# Patient Record
Sex: FEMALE | Race: WHITE | NOT HISPANIC OR LATINO | Employment: FULL TIME | URBAN - METROPOLITAN AREA
[De-identification: names, ages, dates, MRNs, and addresses within clinical notes are randomized per-mention and may not be internally consistent; named-entity substitution may affect disease eponyms.]

---

## 2017-03-02 ENCOUNTER — TELEPHONE (OUTPATIENT)
Dept: FAMILY MEDICINE CLINIC | Facility: CLINIC | Age: 37
End: 2017-03-02

## 2017-03-02 ENCOUNTER — OFFICE VISIT (OUTPATIENT)
Dept: FAMILY MEDICINE CLINIC | Facility: CLINIC | Age: 37
End: 2017-03-02

## 2017-03-02 VITALS
WEIGHT: 194 LBS | SYSTOLIC BLOOD PRESSURE: 128 MMHG | HEART RATE: 95 BPM | DIASTOLIC BLOOD PRESSURE: 84 MMHG | HEIGHT: 68 IN | OXYGEN SATURATION: 97 % | BODY MASS INDEX: 29.4 KG/M2 | TEMPERATURE: 99 F

## 2017-03-02 DIAGNOSIS — J06.9 ACUTE URI: ICD-10-CM

## 2017-03-02 DIAGNOSIS — R53.83 OTHER FATIGUE: ICD-10-CM

## 2017-03-02 DIAGNOSIS — F17.200 SMOKING: ICD-10-CM

## 2017-03-02 DIAGNOSIS — R11.0 NAUSEA: ICD-10-CM

## 2017-03-02 DIAGNOSIS — K21.9 GASTROESOPHAGEAL REFLUX DISEASE WITHOUT ESOPHAGITIS: ICD-10-CM

## 2017-03-02 DIAGNOSIS — N95.2 VAGINAL ATROPHY: ICD-10-CM

## 2017-03-02 DIAGNOSIS — Z00.00 HEALTH CARE MAINTENANCE: ICD-10-CM

## 2017-03-02 DIAGNOSIS — R63.1 POLYDIPSIA: ICD-10-CM

## 2017-03-02 DIAGNOSIS — R68.89 FLU-LIKE SYMPTOMS: Primary | ICD-10-CM

## 2017-03-02 PROCEDURE — 99203 OFFICE O/P NEW LOW 30 MIN: CPT | Performed by: FAMILY MEDICINE

## 2017-03-02 PROCEDURE — 87804 INFLUENZA ASSAY W/OPTIC: CPT | Performed by: FAMILY MEDICINE

## 2017-03-02 RX ORDER — ESTRADIOL 0.1 MG/G
2 CREAM VAGINAL DAILY
Qty: 42.5 G | Refills: 5 | Status: SHIPPED | OUTPATIENT
Start: 2017-03-02

## 2017-03-02 RX ORDER — ESTRADIOL 0.1 MG/G
2 CREAM VAGINAL DAILY
Qty: 42.5 G | Refills: 5 | Status: SHIPPED | OUTPATIENT
Start: 2017-03-02 | End: 2017-03-02 | Stop reason: SDUPTHER

## 2017-03-02 RX ORDER — VARENICLINE TARTRATE 1 MG/1
1 TABLET, FILM COATED ORAL 2 TIMES DAILY
Qty: 60 TABLET | Refills: 5 | Status: SHIPPED | OUTPATIENT
Start: 2017-03-02 | End: 2018-08-21

## 2017-03-02 RX ORDER — OMEPRAZOLE 40 MG/1
40 CAPSULE, DELAYED RELEASE ORAL DAILY
Qty: 30 CAPSULE | Refills: 5 | Status: SHIPPED | OUTPATIENT
Start: 2017-03-02 | End: 2017-03-02 | Stop reason: SDUPTHER

## 2017-03-02 RX ORDER — IBUPROFEN 200 MG
200 TABLET ORAL EVERY 6 HOURS
COMMUNITY

## 2017-03-02 RX ORDER — OMEPRAZOLE 40 MG/1
40 CAPSULE, DELAYED RELEASE ORAL DAILY
Qty: 30 CAPSULE | Refills: 5 | Status: SHIPPED | OUTPATIENT
Start: 2017-03-02

## 2017-03-02 RX ORDER — VARENICLINE TARTRATE 1 MG/1
1 TABLET, FILM COATED ORAL 2 TIMES DAILY
Qty: 60 TABLET | Refills: 5 | Status: SHIPPED | OUTPATIENT
Start: 2017-03-02 | End: 2017-03-02 | Stop reason: SDUPTHER

## 2017-03-02 NOTE — PROGRESS NOTES
Subjective   Karlee Martin is a 36 y.o. female. Presents today for   Chief Complaint   Patient presents with   • Establish Care     pt is here to establish care. she had hysterectomy 4 years ago, wants to discuss hormone replacement   • Illness     cough, bodyaches, fever   • Headache     pt has been getting headaches, dizzy, lethargic (has fallen asleep driving), nausea for the last 2 months   • Nicotine Dependence     pt would like rx to quit smoking.       Cough   This is a new problem. Episode onset: 2 days ago. The problem has been unchanged. The problem occurs constantly. The cough is non-productive. Associated symptoms include chills, a fever, headaches, myalgias, nasal congestion, postnasal drip, rhinorrhea and a sore throat. Pertinent negatives include no chest pain, ear congestion, ear pain, hemoptysis, shortness of breath or wheezing. Nothing aggravates the symptoms. She has tried OTC cough suppressant for the symptoms. The treatment provided no relief. There is no history of asthma, COPD or environmental allergies.   Fatigue   This is a chronic problem. The current episode started more than 1 year ago. The problem occurs constantly. The problem has been unchanged. Associated symptoms include chills, congestion, coughing, fatigue, a fever, headaches, myalgias, nausea and a sore throat. Pertinent negatives include no abdominal pain, chest pain, urinary symptoms, visual change or vomiting. Associated symptoms comments: Sleeps a lot and doesn't matter how much sleeps, just very fatigued.  Has been on going longer than current illness.  Denies snoring.  Feels unrested in am, daytime somnolence, fallen asleep while driving.. Nothing aggravates the symptoms. She has tried nothing for the symptoms. The treatment provided no relief.   Nausea   This is a chronic problem. The current episode started more than 1 month ago. The problem occurs intermittently. The problem has been unchanged. Associated symptoms  include chills, congestion, coughing, fatigue, a fever, headaches, myalgias, nausea and a sore throat. Pertinent negatives include no abdominal pain, chest pain, urinary symptoms, visual change or vomiting. Associated symptoms comments: +polydispia, urinary frequency;  Has strong family hx of diabetes.  No polyphagia.  Occly heartburn at night.  NO vomiting.. Nothing aggravates the symptoms. She has tried nothing for the symptoms. The treatment provided no relief.   Nicotine Dependence   Presents for initial visit. Symptoms include fatigue and sore throat. Preferred tobacco types include cigarettes. Preferred cigarette types include filtered. Preferred strength is light. Preferred brands include [employer]. Her urge triggers include company of smokers and driving. She smokes 1 pack of cigarettes per day. Age when first smoked: 17-18 yoa.  quit 20 for 5 years then restarted. Past treatments include nicotine patch and buproprion (Did not tolerate wellbutrin and patches no relief.). The treatment provided no relief. Compliance with prior treatments has been good. Karlee is ready to quit. Karlee has tried to quit 2 times. There is no history of alcohol abuse.   Patient hystx for endometriosis and had oopherectomy (reports cervix was left behind).  Patient chose not to have HRT at 1st as worried about endometriosis coming back.  Now having vaginal dryness.  Has had resolution of vaginal dryness.    Review of Systems   Constitutional: Positive for chills, fatigue and fever.   HENT: Positive for congestion, postnasal drip, rhinorrhea, sinus pressure and sore throat. Negative for ear pain.    Respiratory: Positive for cough. Negative for hemoptysis, shortness of breath and wheezing.    Cardiovascular: Negative for chest pain and leg swelling.   Gastrointestinal: Positive for constipation (Hx of IBS, had c-scope in past 10 - 12 years.) and nausea. Negative for abdominal pain, anal bleeding, blood in stool, diarrhea and  "vomiting.   Musculoskeletal: Positive for myalgias.   Allergic/Immunologic: Negative for environmental allergies.   Neurological: Positive for headaches. Negative for dizziness and light-headedness.       The following portions of the patient's history were reviewed and updated as appropriate: allergies, current medications, past family history, past medical history, past social history, past surgical history and problem list.    There is no problem list on file for this patient.      No Known Allergies    No current outpatient prescriptions on file prior to visit.     No current facility-administered medications on file prior to visit.        Objective   Vitals:    03/02/17 1222   BP: 128/84   BP Location: Right arm   Patient Position: Sitting   Cuff Size: Large Adult   Pulse: 95   Temp: 99 °F (37.2 °C)   TempSrc: Oral   SpO2: 97%   Weight: 194 lb (88 kg)   Height: 68\" (172.7 cm)       Physical Exam   Constitutional: She appears well-developed and well-nourished.   HENT:   Head: Normocephalic and atraumatic.   Right Ear: Hearing, tympanic membrane, external ear and ear canal normal.   Left Ear: Hearing, tympanic membrane, external ear and ear canal normal.   Nose: Mucosal edema present.   Mouth/Throat: Uvula is midline, oropharynx is clear and moist and mucous membranes are normal.   +PND   Neck: Neck supple. No JVD present. No thyromegaly present.   Cardiovascular: Normal rate, regular rhythm and normal heart sounds.  Exam reveals no gallop and no friction rub.    No murmur heard.  Pulmonary/Chest: Effort normal and breath sounds normal. No respiratory distress. She has no wheezes. She has no rales.   Abdominal: Soft. Bowel sounds are normal. She exhibits no distension. There is no tenderness. There is no rebound and no guarding.   Musculoskeletal: She exhibits no edema.   Neurological: She is alert.   Skin: Skin is warm and dry.   Psychiatric: She has a normal mood and affect. Her behavior is normal.   Nursing " note and vitals reviewed.    Flu negative  Assessment/Plan   Karlee was seen today for establish care, illness, headache and nicotine dependence.    Diagnoses and all orders for this visit:    Flu-like symptoms  -     POC Influenza A / B    Health care maintenance  -     CBC & Differential  -     Comprehensive Metabolic Panel  -     Lipid Panel  -     Vitamin B12  -     TSH  -     T4, Free  -     T3, Free  -     POC Influenza A / B    Other fatigue  -     CBC & Differential  -     Comprehensive Metabolic Panel  -     Lipid Panel  -     Vitamin B12  -     TSH  -     T4, Free  -     T3, Free  -     POC Influenza A / B    Polydipsia  -     CBC & Differential  -     Comprehensive Metabolic Panel  -     Lipid Panel  -     Vitamin B12  -     TSH  -     T4, Free  -     T3, Free  -     POC Influenza A / B    Smoking  -     varenicline (CHANTIX STARTING MONTH NATALIE) 0.5 MG X 11 & 1 MG X 42 tablet; Take 0.5 mg one daily on days 1-2 and 0.5 mg twice daily on days 4-7. Then 1 mg twice daily for a total of 12 weeks.  -     varenicline (CHANTIX CONTINUING MONTH NATALIE) 1 MG tablet; Take 1 tablet by mouth 2 (Two) Times a Day.    Nausea  -     H. Pylori Breath Test  -     omeprazole (priLOSEC) 40 MG capsule; Take 1 capsule by mouth Daily.    Gastroesophageal reflux disease without esophagitis  -     H. Pylori Breath Test  -     omeprazole (priLOSEC) 40 MG capsule; Take 1 capsule by mouth Daily.    Vaginal atrophy  -     estradiol (ESTRACE) 0.1 MG/GM vaginal cream; Insert 2 g into the vagina Daily. X 14 days, then every other day 1 week, then twice weekly    Acute URI  -     HYDROcod Polst-CPM Polst ER (TUSSIONEX PENNKINETIC ER) 10-8 MG/5ML ER suspension; Take 5 mL by mouth Every 12 (Twelve) Hours As Needed for cough or rhinitis.    -I advised the patient of the risks in continuing to use tobacco, and I provided this patient with smoking cessation Rx.  I also discussed how to quit smoking and the patient has expressed the willingness to  quit.    -During this visit, I spent 3-10 mintues counseling the patient regarding smoking cessation.   -I advised increased risks of DVT/VTE with estrogen and smoking.  WIll try topical only, d/w still likely increased risks, but will hold oral, has f/u with gyn to discuss further.  -uri - fluids, rest  -GERD symptoms, check h. Pylori, labs and will start ppi  -screen for diabetes         -Follow up: 3 months       Current Outpatient Prescriptions:   •  ibuprofen (ADVIL,MOTRIN) 200 MG tablet, Take 200 mg by mouth Every 6 (Six) Hours., Disp: , Rfl:

## 2017-03-05 LAB
EXPIRATION DATE: NORMAL
FLUAV AG NPH QL: NORMAL
FLUBV AG NPH QL: NORMAL
INTERNAL CONTROL: NORMAL
Lab: 12

## 2017-03-06 LAB
ALBUMIN SERPL-MCNC: 4.3 G/DL (ref 3.5–5.5)
ALBUMIN/GLOB SERPL: 2.2 {RATIO} (ref 1.1–2.5)
ALP SERPL-CCNC: 68 IU/L (ref 39–117)
ALT SERPL-CCNC: 23 IU/L (ref 0–32)
AST SERPL-CCNC: 22 IU/L (ref 0–40)
BASOPHILS # BLD AUTO: 0 X10E3/UL (ref 0–0.2)
BASOPHILS NFR BLD AUTO: 0 %
BILIRUB SERPL-MCNC: <0.2 MG/DL (ref 0–1.2)
BUN SERPL-MCNC: 26 MG/DL (ref 6–20)
BUN/CREAT SERPL: 48 (ref 8–20)
CALCIUM SERPL-MCNC: 9 MG/DL (ref 8.7–10.2)
CHLORIDE SERPL-SCNC: 106 MMOL/L (ref 96–106)
CHOLEST SERPL-MCNC: 215 MG/DL (ref 100–199)
CO2 SERPL-SCNC: 21 MMOL/L (ref 18–29)
CREAT SERPL-MCNC: 0.54 MG/DL (ref 0.57–1)
EOSINOPHIL # BLD AUTO: 0.1 X10E3/UL (ref 0–0.4)
EOSINOPHIL NFR BLD AUTO: 2 %
ERYTHROCYTE [DISTWIDTH] IN BLOOD BY AUTOMATED COUNT: 14 % (ref 12.3–15.4)
GLOBULIN SER CALC-MCNC: 2 G/DL (ref 1.5–4.5)
GLUCOSE SERPL-MCNC: 118 MG/DL (ref 65–99)
HCT VFR BLD AUTO: 39.3 % (ref 34–46.6)
HDLC SERPL-MCNC: 35 MG/DL
HGB BLD-MCNC: 12.8 G/DL (ref 11.1–15.9)
IMM GRANULOCYTES # BLD: 0 X10E3/UL (ref 0–0.1)
IMM GRANULOCYTES NFR BLD: 0 %
LDLC SERPL CALC-MCNC: 135 MG/DL (ref 0–99)
LYMPHOCYTES # BLD AUTO: 2.4 X10E3/UL (ref 0.7–3.1)
LYMPHOCYTES NFR BLD AUTO: 40 %
MCH RBC QN AUTO: 31 PG (ref 26.6–33)
MCHC RBC AUTO-ENTMCNC: 32.6 G/DL (ref 31.5–35.7)
MCV RBC AUTO: 95 FL (ref 79–97)
MONOCYTES # BLD AUTO: 0.4 X10E3/UL (ref 0.1–0.9)
MONOCYTES NFR BLD AUTO: 7 %
NEUTROPHILS # BLD AUTO: 3 X10E3/UL (ref 1.4–7)
NEUTROPHILS NFR BLD AUTO: 51 %
PLATELET # BLD AUTO: 211 X10E3/UL (ref 150–379)
POTASSIUM SERPL-SCNC: 4 MMOL/L (ref 3.5–5.2)
PROT SERPL-MCNC: 6.3 G/DL (ref 6–8.5)
RBC # BLD AUTO: 4.13 X10E6/UL (ref 3.77–5.28)
SODIUM SERPL-SCNC: 144 MMOL/L (ref 134–144)
T3FREE SERPL-MCNC: 4.3 PG/ML (ref 2–4.4)
T4 FREE SERPL-MCNC: 1.02 NG/DL (ref 0.82–1.77)
TRIGL SERPL-MCNC: 225 MG/DL (ref 0–149)
TSH SERPL DL<=0.005 MIU/L-ACNC: 1.43 UIU/ML (ref 0.45–4.5)
UREA BREATH TEST QL: NEGATIVE
VIT B12 SERPL-MCNC: 266 PG/ML (ref 211–946)
VLDLC SERPL CALC-MCNC: 45 MG/DL (ref 5–40)
WBC # BLD AUTO: 6 X10E3/UL (ref 3.4–10.8)

## 2017-03-09 NOTE — PROGRESS NOTES
Please call the patient regarding her abnormal result.  B12 low, can cause fatigue.  Start Oral B12 1000mcg po daily and injections weekly x 4 weeks then monthly x 6 months  Thyroid normal, blood counts normal  Kidney, liver function normal  Cholesterol mildly elevated, just work on diet.

## 2017-03-15 ENCOUNTER — TELEPHONE (OUTPATIENT)
Dept: FAMILY MEDICINE CLINIC | Facility: CLINIC | Age: 37
End: 2017-03-15

## 2017-03-15 DIAGNOSIS — E53.8 VITAMIN B 12 DEFICIENCY: Primary | ICD-10-CM

## 2017-03-15 RX ORDER — LANOLIN ALCOHOL/MO/W.PET/CERES
1000 CREAM (GRAM) TOPICAL DAILY
Qty: 1 TABLET | Refills: 0
Start: 2017-03-15

## 2017-03-15 RX ORDER — CYANOCOBALAMIN 1000 UG/ML
1000 INJECTION, SOLUTION INTRAMUSCULAR; SUBCUTANEOUS
Status: SHIPPED | OUTPATIENT
Start: 2017-04-17 | End: 2017-10-02

## 2017-03-15 RX ORDER — CYANOCOBALAMIN 1000 UG/ML
1000 INJECTION, SOLUTION INTRAMUSCULAR; SUBCUTANEOUS
Status: SHIPPED | OUTPATIENT
Start: 2017-03-15 | End: 2017-04-12

## 2017-03-15 NOTE — PROGRESS NOTES
I have been unable to reach pt by phone with her results. I mailed her a copy with detailed instructions, and ordered her b12 injections.

## 2017-05-17 ENCOUNTER — TELEPHONE (OUTPATIENT)
Dept: FAMILY MEDICINE CLINIC | Facility: CLINIC | Age: 37
End: 2017-05-17

## 2017-05-17 RX ORDER — AMOXICILLIN 500 MG/1
1000 CAPSULE ORAL 3 TIMES DAILY
Qty: 60 CAPSULE | Refills: 0 | Status: SHIPPED | OUTPATIENT
Start: 2017-05-17 | End: 2017-05-27

## 2018-03-05 RX ORDER — AMOXICILLIN 500 MG/1
CAPSULE ORAL
Qty: 60 CAPSULE | Refills: 0 | OUTPATIENT
Start: 2018-03-05

## 2018-06-12 ENCOUNTER — PATIENT MESSAGE (OUTPATIENT)
Dept: FAMILY MEDICINE CLINIC | Facility: CLINIC | Age: 38
End: 2018-06-12

## 2018-06-12 ENCOUNTER — OFFICE VISIT (OUTPATIENT)
Dept: FAMILY MEDICINE CLINIC | Facility: CLINIC | Age: 38
End: 2018-06-12

## 2018-06-12 VITALS
SYSTOLIC BLOOD PRESSURE: 122 MMHG | HEIGHT: 68 IN | DIASTOLIC BLOOD PRESSURE: 78 MMHG | HEART RATE: 108 BPM | BODY MASS INDEX: 26.52 KG/M2 | TEMPERATURE: 99.1 F | OXYGEN SATURATION: 98 % | WEIGHT: 175 LBS

## 2018-06-12 DIAGNOSIS — R50.9 FEVER, UNSPECIFIED FEVER CAUSE: Primary | ICD-10-CM

## 2018-06-12 DIAGNOSIS — J02.9 PHARYNGITIS, UNSPECIFIED ETIOLOGY: ICD-10-CM

## 2018-06-12 DIAGNOSIS — R11.2 NAUSEA AND VOMITING, INTRACTABILITY OF VOMITING NOT SPECIFIED, UNSPECIFIED VOMITING TYPE: ICD-10-CM

## 2018-06-12 LAB
EXPIRATION DATE: NORMAL
INTERNAL CONTROL: NORMAL
Lab: NORMAL
S PYO AG THROAT QL: NEGATIVE

## 2018-06-12 PROCEDURE — 99213 OFFICE O/P EST LOW 20 MIN: CPT | Performed by: NURSE PRACTITIONER

## 2018-06-12 PROCEDURE — 87880 STREP A ASSAY W/OPTIC: CPT | Performed by: NURSE PRACTITIONER

## 2018-06-12 RX ORDER — AMOXICILLIN 875 MG/1
875 TABLET, COATED ORAL 2 TIMES DAILY
Qty: 20 TABLET | Refills: 0 | Status: SHIPPED | OUTPATIENT
Start: 2018-06-12 | End: 2018-08-16 | Stop reason: SDUPTHER

## 2018-06-12 RX ORDER — ONDANSETRON 4 MG/1
4 TABLET, ORALLY DISINTEGRATING ORAL EVERY 8 HOURS PRN
Qty: 15 TABLET | Refills: 0 | Status: SHIPPED | OUTPATIENT
Start: 2018-06-12 | End: 2018-08-21 | Stop reason: SDUPTHER

## 2018-06-12 RX ORDER — COVID-19 ANTIGEN TEST
KIT MISCELLANEOUS 2 TIMES DAILY PRN
COMMUNITY

## 2018-06-12 NOTE — PROGRESS NOTES
Subjective   Karlee Martin is a 38 y.o. female. Fever, vomiting, nausea. Started getting sick on Sat with these symptoms. T max 103.8. Took some tylenol. Feels like this is the stomach flu. There have been about 4 people at her work who have had the same thing and her dad had it as well. She had Hepatitis A vaccine about one year ago. She has not really taken anything for the nausea. She has been drinking liquids. Chest congestion with cough. Chest feels tight. Also having congestion in the sinus congestion.     Nausea   This is a new problem. The current episode started in the past 7 days. The problem occurs intermittently. The problem has been unchanged. Associated symptoms include chills, congestion, coughing, fatigue, a fever, nausea, a sore throat, swollen glands and vomiting. Nothing aggravates the symptoms. She has tried drinking (Maalox, increased fluids) for the symptoms.   Cough   This is a new problem. The current episode started in the past 7 days. The problem has been gradually worsening. The problem occurs constantly. The cough is non-productive. Associated symptoms include chills, ear pain (right), a fever and a sore throat. Nothing aggravates the symptoms. Treatments tried: Nyquil. The treatment provided no relief. Her past medical history is significant for bronchitis.        The following portions of the patient's history were reviewed and updated as appropriate: allergies, current medications, past family history, past medical history, past social history, past surgical history and problem list.    Review of Systems   Constitutional: Positive for appetite change, chills, fatigue and fever.   HENT: Positive for congestion, ear pain (right) and sore throat.    Respiratory: Positive for cough.    Gastrointestinal: Positive for nausea and vomiting.       Objective   Physical Exam   Constitutional: Vital signs are normal. She appears well-developed and well-nourished. She is cooperative.   HENT:  "  Right Ear: Hearing normal. Tympanic membrane is erythematous. A middle ear effusion is present.   Left Ear: Hearing and tympanic membrane normal.   Nose: Nose normal.   Mouth/Throat: Uvula is midline and mucous membranes are normal. Posterior oropharyngeal edema and posterior oropharyngeal erythema present. Tonsils are 2+ on the right. Tonsils are 2+ on the left. Tonsillar exudate.   + submandibular and anterior cervical lymphadenopathy   Cardiovascular: Normal rate, regular rhythm and normal heart sounds.    Pulmonary/Chest: Effort normal and breath sounds normal.   Neurological: She is alert.   Nursing note and vitals reviewed.    Vitals:    06/12/18 1604   BP: 122/78   Pulse: 108   Temp: 99.1 °F (37.3 °C)   TempSrc: Oral   SpO2: 98%   Weight: 79.4 kg (175 lb)   Height: 172.7 cm (67.99\")     Results for orders placed or performed in visit on 06/12/18   POC Rapid Strep A   Result Value Ref Range    Rapid Strep A Screen Negative Negative, VALID, INVALID, Not Performed    Internal Control Passed Passed    Lot Number [id number]     Expiration Date 2019/12/31        Assessment/Plan   Diagnoses and all orders for this visit:    Fever, unspecified fever cause  -     POC Rapid Strep A    Nausea and vomiting, intractability of vomiting not specified, unspecified vomiting type  -     ondansetron ODT (ZOFRAN ODT) 4 MG disintegrating tablet; Take 1 tablet by mouth Every 8 (Eight) Hours As Needed for Nausea or Vomiting.    Pharyngitis, unspecified etiology  -     amoxicillin (AMOXIL) 875 MG tablet; Take 1 tablet by mouth 2 (Two) Times a Day.      Will go ahead and treat throat with antibiotics as all signs point to strep.   Zofran then attempt to rehydrate slowly  Off work until fever free for 24 hours.  RTC if not improved.           "

## 2018-06-14 ENCOUNTER — TELEPHONE (OUTPATIENT)
Dept: FAMILY MEDICINE CLINIC | Facility: CLINIC | Age: 38
End: 2018-06-14

## 2018-06-14 RX ORDER — GUAIFENESIN AND CODEINE PHOSPHATE 100; 10 MG/5ML; MG/5ML
5 SOLUTION ORAL 4 TIMES DAILY PRN
Qty: 180 ML | Refills: 0 | OUTPATIENT
Start: 2018-06-14 | End: 2020-11-05

## 2018-08-16 DIAGNOSIS — J02.9 PHARYNGITIS, UNSPECIFIED ETIOLOGY: ICD-10-CM

## 2018-08-16 RX ORDER — AMOXICILLIN 875 MG/1
875 TABLET, COATED ORAL 2 TIMES DAILY
Qty: 20 TABLET | Refills: 0 | Status: SHIPPED | OUTPATIENT
Start: 2018-08-16 | End: 2018-08-21 | Stop reason: SDUPTHER

## 2018-08-16 RX ORDER — OXYBUTYNIN CHLORIDE 5 MG/1
TABLET ORAL
Refills: 0 | OUTPATIENT
Start: 2018-08-16

## 2018-08-16 RX ORDER — AMOXICILLIN 875 MG/1
TABLET, COATED ORAL
Qty: 20 TABLET | Refills: 0 | OUTPATIENT
Start: 2018-08-16

## 2018-08-16 NOTE — TELEPHONE ENCOUNTER
----- Message from Kralee Martin sent at 8/16/2018  2:25 PM EDT -----  Regarding: RE: Prescription Question  Contact: [phone]  Ok. I can do Monday after 2. Can the amoxicillin refill be refilled so I don't get worse?  ----- Message -----  From: ASH Fischer  Sent: 8/16/2018  1:01 PM EDT  To: Karlee Martin  Subject: RE: Prescription Question  I'll be happy to see her on Monday or Tues after 2 pm.  Looks like I have several openings.     ----- Message -----     From: Karlee Martin     Sent: 8/16/2018  8:52 AM EDT       To: ASH Fischer  Subject: Prescription Question    I need to schedule an appointment with Dr Stapleton or yourself. But I can't get in til next Monday or Tuesday. I am pretty sure I have a right ear infection and possibly strep---i was wondering if dr Stapleton or yourself would call in an antibiotic until I can get seen next week? I think I have an amoxicillin 875mg that could just be refilled.  If you can schedule anything for next Monday or Tuesday for after 2pm I would be grateful.     Karlee Martin [phone]

## 2018-08-16 NOTE — TELEPHONE ENCOUNTER
Send her 6 amoxicillin 875 mg, one po bid. The last time we did this she did not show for the appt.

## 2018-08-21 ENCOUNTER — OFFICE VISIT (OUTPATIENT)
Dept: FAMILY MEDICINE CLINIC | Facility: CLINIC | Age: 38
End: 2018-08-21

## 2018-08-21 VITALS
TEMPERATURE: 99.1 F | OXYGEN SATURATION: 98 % | SYSTOLIC BLOOD PRESSURE: 132 MMHG | DIASTOLIC BLOOD PRESSURE: 76 MMHG | HEIGHT: 68 IN | WEIGHT: 180 LBS | HEART RATE: 97 BPM | BODY MASS INDEX: 27.28 KG/M2

## 2018-08-21 DIAGNOSIS — J02.9 PHARYNGITIS, UNSPECIFIED ETIOLOGY: ICD-10-CM

## 2018-08-21 DIAGNOSIS — R05.9 COUGH: ICD-10-CM

## 2018-08-21 DIAGNOSIS — R11.2 NAUSEA AND VOMITING, INTRACTABILITY OF VOMITING NOT SPECIFIED, UNSPECIFIED VOMITING TYPE: ICD-10-CM

## 2018-08-21 DIAGNOSIS — H65.01 RIGHT ACUTE SEROUS OTITIS MEDIA, RECURRENCE NOT SPECIFIED: Primary | ICD-10-CM

## 2018-08-21 LAB
EXPIRATION DATE: NORMAL
INTERNAL CONTROL: NORMAL
Lab: NORMAL
S PYO AG THROAT QL: NEGATIVE

## 2018-08-21 PROCEDURE — 99213 OFFICE O/P EST LOW 20 MIN: CPT | Performed by: NURSE PRACTITIONER

## 2018-08-21 PROCEDURE — 87880 STREP A ASSAY W/OPTIC: CPT | Performed by: NURSE PRACTITIONER

## 2018-08-21 RX ORDER — AMOXICILLIN 875 MG/1
875 TABLET, COATED ORAL 2 TIMES DAILY
Qty: 20 TABLET | Refills: 0 | Status: SHIPPED | OUTPATIENT
Start: 2018-08-21 | End: 2020-11-05

## 2018-08-21 RX ORDER — ONDANSETRON 4 MG/1
4 TABLET, ORALLY DISINTEGRATING ORAL EVERY 8 HOURS PRN
Qty: 15 TABLET | Refills: 0 | Status: SHIPPED | OUTPATIENT
Start: 2018-08-21 | End: 2020-11-05

## 2018-08-21 NOTE — PROGRESS NOTES
Subjective   Karlee Martin is a 38 y.o. female. R earache, sore throat, congestion, vomiting, nausea, cough, fever. Started getting sick about one week ago. She had called the office and was sent some Amoxicillin which she started on Thurs night. Now with cough and vomiting. Boyfriend had bronchitis about 2 weeks ago. She was exposed to strep at work. Several co-workers had symptoms of flu at work but no one went to the Dr. And confirmed with the flu or anything. The right ear is really painful and the right side of the neck is swollen.      Sore Throat    This is a new problem. The current episode started in the past 7 days. The problem has been gradually worsening. The pain is worse on the right side. The maximum temperature recorded prior to her arrival was 101 - 101.9 F. The fever has been present for 5 days or more. The pain is at a severity of 8/10. Associated symptoms include congestion, coughing, ear pain, headaches, a hoarse voice, neck pain, swollen glands, trouble swallowing and vomiting. Pertinent negatives include no abdominal pain, diarrhea, drooling, ear discharge, plugged ear sensation, shortness of breath or stridor. She has had exposure to strep. She has had no exposure to mono.        The following portions of the patient's history were reviewed and updated as appropriate: allergies, current medications, past family history, past medical history, past social history, past surgical history and problem list.    Review of Systems   Constitutional: Positive for chills and fever.   HENT: Positive for congestion, ear pain, hoarse voice, sore throat and trouble swallowing. Negative for drooling and ear discharge.    Respiratory: Positive for cough. Negative for shortness of breath and stridor.    Gastrointestinal: Positive for vomiting. Negative for abdominal pain and diarrhea.   Musculoskeletal: Positive for neck pain.       Objective   Physical Exam   Constitutional: Vital signs are normal. She  "appears well-developed and well-nourished. She is cooperative. She appears ill.   HENT:   Right Ear: Hearing normal. Tympanic membrane is erythematous and bulging. A middle ear effusion is present.   Left Ear: Hearing normal. A middle ear effusion is present.   Nose: Mucosal edema and rhinorrhea present.   Mouth/Throat: Uvula is midline and mucous membranes are normal. Posterior oropharyngeal edema and posterior oropharyngeal erythema present. Tonsils are 2+ on the right. Tonsils are 2+ on the left. No tonsillar exudate.   Cardiovascular: Normal rate, regular rhythm and normal heart sounds.    Pulmonary/Chest: Effort normal and breath sounds normal. She has no decreased breath sounds.   Neurological: She is alert.   Psychiatric: She has a normal mood and affect. Her behavior is normal. Judgment and thought content normal.   Nursing note and vitals reviewed.    Vitals:    08/21/18 1434   BP: 132/76   Pulse: 97   Temp: 99.1 °F (37.3 °C)   TempSrc: Oral   SpO2: 98%   Weight: 81.6 kg (180 lb)   Height: 172.7 cm (67.99\")     Results for orders placed or performed in visit on 08/21/18   POC Rapid Strep A   Result Value Ref Range    Rapid Strep A Screen Negative Negative, VALID, INVALID, Not Performed    Internal Control Passed Passed    Lot Number [id number]     Expiration Date 2019/12/31        Assessment/Plan   Diagnoses and all orders for this visit:    Right acute serous otitis media, recurrence not specified  -     amoxicillin (AMOXIL) 875 MG tablet; Take 1 tablet by mouth 2 (Two) Times a Day.    Pharyngitis, unspecified etiology  -     POC Rapid Strep A  -     amoxicillin (AMOXIL) 875 MG tablet; Take 1 tablet by mouth 2 (Two) Times a Day.    Nausea and vomiting, intractability of vomiting not specified, unspecified vomiting type  -     ondansetron ODT (ZOFRAN ODT) 4 MG disintegrating tablet; Take 1 tablet by mouth Every 8 (Eight) Hours As Needed for Nausea or Vomiting.    Cough  -     HYDROcod Polst-CPM Polst ER " (TUSSIONEX PENNKINETIC ER) 10-8 MG/5ML ER suspension; Take 5 mL by mouth Every 12 (Twelve) Hours As Needed for Cough or Rhinitis.    Finish the amoxicillin but if not improving by Fri, will change to Omnicef. Send message and I will send to pharmacy.   RTC if worse  Push fluids  Cough medication  As needed  Zofran as needed for nausea/vomiting

## 2018-08-29 RX ORDER — CEFDINIR 300 MG/1
300 CAPSULE ORAL 2 TIMES DAILY
Qty: 14 CAPSULE | Refills: 0 | Status: SHIPPED | OUTPATIENT
Start: 2018-08-29 | End: 2020-11-05

## 2018-09-12 RX ORDER — CEFDINIR 300 MG/1
CAPSULE ORAL
Qty: 14 CAPSULE | Refills: 0 | OUTPATIENT
Start: 2018-09-12

## 2019-02-22 RX ORDER — MUPIROCIN CALCIUM 20 MG/G
CREAM TOPICAL
Qty: 15 G | Refills: 0 | OUTPATIENT
Start: 2019-02-22

## 2019-02-24 ENCOUNTER — OFFICE VISIT (OUTPATIENT)
Dept: RETAIL CLINIC | Facility: CLINIC | Age: 39
End: 2019-02-24

## 2019-02-24 VITALS
SYSTOLIC BLOOD PRESSURE: 108 MMHG | DIASTOLIC BLOOD PRESSURE: 70 MMHG | BODY MASS INDEX: 27.07 KG/M2 | HEART RATE: 114 BPM | TEMPERATURE: 98.5 F | RESPIRATION RATE: 18 BRPM | WEIGHT: 178 LBS | OXYGEN SATURATION: 98 %

## 2019-02-24 DIAGNOSIS — L01.00 IMPETIGO: Primary | ICD-10-CM

## 2019-02-24 PROCEDURE — 99213 OFFICE O/P EST LOW 20 MIN: CPT | Performed by: NURSE PRACTITIONER

## 2019-02-24 RX ORDER — MUPIROCIN CALCIUM 20 MG/G
CREAM TOPICAL 3 TIMES DAILY
Qty: 30 G | Refills: 0 | Status: SHIPPED | OUTPATIENT
Start: 2019-02-24 | End: 2020-11-05

## 2019-02-24 NOTE — PATIENT INSTRUCTIONS
Impetigo, Adult  Impetigo is an infection of the skin. It commonly occurs in young children, but it can also occur in adults. The infection causes itchy blisters and sores that produce brownish-yellow fluid. As the fluid dries, it forms a thick, honey-colored crust. These skin changes usually occur on the face but can also affect other areas of the body. Impetigo usually goes away in 7-10 days with treatment.  What are the causes?  Impetigo is caused by two types of bacteria. It may be caused by staphylococci or streptococci bacteria. These bacteria cause impetigo when they get under the surface of the skin. This often happens after some damage to the skin, such as damage from:  · Cuts, scrapes, or scratches.  · Insect bites, especially when you scratch the area of a bite.  · Chickenpox or other illnesses that cause open skin sores.  · Nail biting or chewing.    Impetigo is contagious and can spread easily from one person to another. This may occur through close skin contact or by sharing towels, clothing, or other items with a person who has the infection.  What increases the risk?  Some things that can increase the risk of getting this infection include:  · Playing sports that include skin-to-skin contact with others.  · Having a skin condition with open sores.  · Having many skin cuts or scrapes.  · Living in an area that has high humidity levels.  · Having poor hygiene.  · Having high levels of staphylococci in your nose.    What are the signs or symptoms?  Impetigo usually starts out as small blisters, often on the face. The blisters then break open and turn into tiny sores (lesions) with a yellow crust. In some cases, the blisters cause itching or burning. With scratching, irritation, or lack of treatment, these small lesions may get larger. Scratching can also cause impetigo to spread to other parts of the body. The bacteria can get under the fingernails and spread when you touch another area of your  skin.  Other possible symptoms include:  · Larger blisters.  · Pus.  · Swollen lymph glands.    How is this diagnosed?  This condition is usually diagnosed during a physical exam. A skin sample or sample of fluid from a blister may be taken for lab tests that involve growing bacteria (culture test). This can help confirm the diagnosis or help determine the best treatment.  How is this treated?  Mild impetigo can be treated with prescription antibiotic cream. Oral antibiotic medicine may be used in more severe cases. Medicines for itching may also be used.  Follow these instructions at home:  · Take medicines only as directed by your health care provider.  · To help prevent impetigo from spreading to other body areas:  ? Keep your fingernails short and clean.  ? Do not scratch the blisters or sores.  ? Cover infected areas, if necessary, to keep from scratching.  · Gently wash the infected areas with antibiotic soap and water.  · Soak crusted areas in warm, soapy water using antibiotic soap.  ? Gently rub the areas to remove crusts. Do not scrub.  · Wash your hands often to avoid spreading this infection.  · Stay home until you have used an antibiotic cream for 48 hours (2 days) or an oral antibiotic medicine for 24 hours (1 day). You should only return to work and activities with other people if your skin shows significant improvement.  How is this prevented?  To keep the infection from spreading:  · Stay home until you have used an antibiotic cream for 48 hours or an oral antibiotic for 24 hours.  · Wash your hands often.  · Do not engage in skin-to-skin contact with other people while you have still have blisters.  · Do not share towels, washcloths, or bedding with others while you have the infection.    Contact a health care provider if:  · You develop more blisters or sores despite treatment.  · Other family members get sores.  · Your skin sores are not improving after 48 hours of treatment.  · You have a  fever.  Get help right away if:  · You see spreading redness or swelling of the skin around your sores.  · You see red streaks coming from your sores.  · You develop a sore throat.  This information is not intended to replace advice given to you by your health care provider. Make sure you discuss any questions you have with your health care provider.  Document Released: 01/08/2016 Document Revised: 05/25/2017 Document Reviewed: 12/01/2015  [employer] Interactive Patient Education © 2017 [employer]

## 2019-02-24 NOTE — PROGRESS NOTES
Subjective   Karlee Martin is a 38 y.o. female.       /70 (BP Location: Left arm, Patient Position: Sitting, Cuff Size: Adult)   Pulse 114   Temp 98.5 °F (36.9 °C) (Oral)   Resp 18   Wt 80.7 kg (178 lb)   SpO2 98%   BMI 27.07 kg/m²     Rash   This is a new problem. The current episode started yesterday. The problem has been gradually worsening since onset. The affected locations include the face. The rash is characterized by blistering and itchiness. She was exposed to nothing. Past treatments include nothing. The treatment provided no relief.        The following portions of the patient's history were reviewed and updated as appropriate: current medications, past family history, past medical history, past social history, past surgical history and problem list.    Review of Systems   Constitutional: Negative.    HENT: Negative.    Eyes: Negative.    Respiratory: Negative.    Cardiovascular: Negative.    Endocrine: Negative.    Genitourinary: Negative.    Musculoskeletal: Negative.    Skin: Positive for rash and skin lesions.   Allergic/Immunologic: Negative.    Neurological: Negative.    Hematological: Negative.    Psychiatric/Behavioral: Negative.        Objective   Physical Exam   Constitutional: She is oriented to person, place, and time. She appears well-developed and well-nourished.   HENT:   Head: Normocephalic and atraumatic.   Eyes: Conjunctivae and EOM are normal. Pupils are equal, round, and reactive to light.   Neck: Normal range of motion.   Cardiovascular: Normal rate, regular rhythm and normal heart sounds.   Pulmonary/Chest: Effort normal and breath sounds normal.   Abdominal: Soft. Bowel sounds are normal.   Musculoskeletal: Normal range of motion.   Neurological: She is alert and oriented to person, place, and time.   Skin: Capillary refill takes less than 2 seconds. Rash noted. Rash is pustular (honey crusted ).   Psychiatric: She has a normal mood and affect.   Vitals  reviewed.        Assessment/Plan   Karlee was seen today for rash.    Diagnoses and all orders for this visit:    Impetigo  -     mupirocin (BACTROBAN) 2 % cream; Apply  topically to the appropriate area as directed 3 (Three) Times a Day.      Pt defers keflex   Impetigo, Adult  Impetigo is an infection of the skin. It commonly occurs in young children, but it can also occur in adults. The infection causes itchy blisters and sores that produce brownish-yellow fluid. As the fluid dries, it forms a thick, honey-colored crust. These skin changes usually occur on the face but can also affect other areas of the body. Impetigo usually goes away in 7-10 days with treatment.  What are the causes?  Impetigo is caused by two types of bacteria. It may be caused by staphylococci or streptococci bacteria. These bacteria cause impetigo when they get under the surface of the skin. This often happens after some damage to the skin, such as damage from:  · Cuts, scrapes, or scratches.  · Insect bites, especially when you scratch the area of a bite.  · Chickenpox or other illnesses that cause open skin sores.  · Nail biting or chewing.    Impetigo is contagious and can spread easily from one person to another. This may occur through close skin contact or by sharing towels, clothing, or other items with a person who has the infection.  What increases the risk?  Some things that can increase the risk of getting this infection include:  · Playing sports that include skin-to-skin contact with others.  · Having a skin condition with open sores.  · Having many skin cuts or scrapes.  · Living in an area that has high humidity levels.  · Having poor hygiene.  · Having high levels of staphylococci in your nose.    What are the signs or symptoms?  Impetigo usually starts out as small blisters, often on the face. The blisters then break open and turn into tiny sores (lesions) with a yellow crust. In some cases, the blisters cause itching or  burning. With scratching, irritation, or lack of treatment, these small lesions may get larger. Scratching can also cause impetigo to spread to other parts of the body. The bacteria can get under the fingernails and spread when you touch another area of your skin.  Other possible symptoms include:  · Larger blisters.  · Pus.  · Swollen lymph glands.    How is this diagnosed?  This condition is usually diagnosed during a physical exam. A skin sample or sample of fluid from a blister may be taken for lab tests that involve growing bacteria (culture test). This can help confirm the diagnosis or help determine the best treatment.  How is this treated?  Mild impetigo can be treated with prescription antibiotic cream. Oral antibiotic medicine may be used in more severe cases. Medicines for itching may also be used.  Follow these instructions at home:  · Take medicines only as directed by your health care provider.  · To help prevent impetigo from spreading to other body areas:  ? Keep your fingernails short and clean.  ? Do not scratch the blisters or sores.  ? Cover infected areas, if necessary, to keep from scratching.  · Gently wash the infected areas with antibiotic soap and water.  · Soak crusted areas in warm, soapy water using antibiotic soap.  ? Gently rub the areas to remove crusts. Do not scrub.  · Wash your hands often to avoid spreading this infection.  · Stay home until you have used an antibiotic cream for 48 hours (2 days) or an oral antibiotic medicine for 24 hours (1 day). You should only return to work and activities with other people if your skin shows significant improvement.  How is this prevented?  To keep the infection from spreading:  · Stay home until you have used an antibiotic cream for 48 hours or an oral antibiotic for 24 hours.  · Wash your hands often.  · Do not engage in skin-to-skin contact with other people while you have still have blisters.  · Do not share towels, washcloths, or bedding  with others while you have the infection.    Contact a health care provider if:  · You develop more blisters or sores despite treatment.  · Other family members get sores.  · Your skin sores are not improving after 48 hours of treatment.  · You have a fever.  Get help right away if:  · You see spreading redness or swelling of the skin around your sores.  · You see red streaks coming from your sores.  · You develop a sore throat.  This information is not intended to replace advice given to you by your health care provider. Make sure you discuss any questions you have with your health care provider.  Document Released: 01/08/2016 Document Revised: 05/25/2017 Document Reviewed: 12/01/2015  [employer] Interactive Patient Education © 2017 [employer]

## 2019-07-16 ENCOUNTER — LAB REQUISITION (OUTPATIENT)
Dept: LAB | Facility: HOSPITAL | Age: 39
End: 2019-07-16

## 2019-07-16 DIAGNOSIS — N20.9 URINARY CALCULUS: ICD-10-CM

## 2019-07-16 PROCEDURE — 82360 CALCULUS ASSAY QUANT: CPT

## 2019-07-23 LAB
CA PHOS CRY STONE QL IR: 5 %
COD CRY STONE QL IR: 90 %
COLOR STONE: NORMAL
COM CRY STONE QL IR: 5 %
COMPN STONE: NORMAL
CONV COMMENT: NORMAL
Lab: NORMAL
Lab: NORMAL
NIDUS STONE QL: NORMAL
PATH REPORT.COMMENTS IMP SPEC: NORMAL
SIZE STONE: NORMAL MM
WT STONE: 6.7 MG

## 2021-03-31 ENCOUNTER — BULK ORDERING (OUTPATIENT)
Dept: CASE MANAGEMENT | Facility: OTHER | Age: 41
End: 2021-03-31

## 2021-03-31 DIAGNOSIS — Z23 IMMUNIZATION DUE: ICD-10-CM

## 2025-07-24 ENCOUNTER — TRANSCRIBE ORDERS (OUTPATIENT)
Dept: OCCUPATIONAL THERAPY | Facility: CLINIC | Age: 45
End: 2025-07-24
Payer: COMMERCIAL

## 2025-07-24 DIAGNOSIS — S76.012D HIP STRAIN, LEFT, SUBSEQUENT ENCOUNTER: Primary | ICD-10-CM

## 2025-07-24 DIAGNOSIS — S39.012D STRAIN OF LUMBAR REGION, SUBSEQUENT ENCOUNTER: ICD-10-CM

## 2025-07-30 ENCOUNTER — TELEPHONE (OUTPATIENT)
Dept: PHYSICAL THERAPY | Facility: CLINIC | Age: 45
End: 2025-07-30
Payer: COMMERCIAL

## 2025-07-30 NOTE — TELEPHONE ENCOUNTER
DID NOT SHOW FOR ABEL CRAMER THAT WE WILL CANCEL THE REMAINING APPTS AND SHE NEEDS TO CALL AND RESCHEDULE.